# Patient Record
Sex: FEMALE | Race: BLACK OR AFRICAN AMERICAN | URBAN - METROPOLITAN AREA
[De-identification: names, ages, dates, MRNs, and addresses within clinical notes are randomized per-mention and may not be internally consistent; named-entity substitution may affect disease eponyms.]

---

## 2018-07-18 ENCOUNTER — HISTORICAL (OUTPATIENT)
Dept: ADMINISTRATIVE | Facility: HOSPITAL | Age: 23
End: 2018-07-18

## 2018-07-20 LAB — FINAL CULTURE: NORMAL

## 2022-04-07 ENCOUNTER — HISTORICAL (OUTPATIENT)
Dept: ADMINISTRATIVE | Facility: HOSPITAL | Age: 27
End: 2022-04-07

## 2022-04-23 VITALS
DIASTOLIC BLOOD PRESSURE: 69 MMHG | HEIGHT: 66 IN | OXYGEN SATURATION: 100 % | WEIGHT: 169.75 LBS | BODY MASS INDEX: 27.28 KG/M2 | SYSTOLIC BLOOD PRESSURE: 116 MMHG

## 2022-05-02 NOTE — HISTORICAL [facility] CERNER
This is a historical note converted from [facility]. Formatting and pictures may have been removed.  Please reference Malena for original formatting and attached multimedia. Chief Complaint  lower abd pain,dysuria x 2-3 weeks ago took cystex OTC helped with dysuria still has back pain  History of Present Illness  24 yo female with complaints of lower back pain x 1 1/2 months; no trauma or injury. No weakness, tingling or numbness to BLE. 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 Dysuria? 2-3 weeks ago; no hematuria or vaginal discharge. Took cystex 2-3 weeks ago with improvement of dysuria.  Review of Systems  GENERAL: Negative except as stated?in HPI  CV: Negative except as stated in HPI  RESP: Negative except as stated?in HPI  GI: Negative?except as stated in?HPI  : Negative?except as stated in?HPI  SKIN: Negative?except as stated in?HPI  Neuro: Negative?except as stated in?HPI  MS: Negative?except as stated in?HPI  Psych: Negative?except as stated in?HPI  Physical Exam  Vitals & Measurements  T:?36.7? ?C (Oral)? HR:?71(Peripheral)? RR:?18? BP:?116/69? SpO2:?100%?  HT:?167?cm? HT:?167?cm? WT:?77?kg? WT:?77?kg? BMI:?27.61?  Vital Signs reviewed  General: Alert and oriented; no acute distress.  HENT: Normocephalic.  Respiratory: Lungs clear to auscultation; respirations even and non labored.  Cardiovascular: Regular rate and rhythm. No edema. Normal peripheral pulses and perfusion.  Musculoskeletal: Normal gait. No CVA tenderness. No point tenderness over spinous processes of back. + Paravertebral lumbar?tenderness (on left)?with palpation?and?muscle spasms.?- straight leg raises.  Integumentary: Warm and dry. No bruising or erythema.  Neurologic: Alert and oriented. NO focal or sensory deficits. Symmetrical +2 strength to BLE.  Psychiatric: Cooperative.?  Assessment/Plan  1.?Acute UTI  Small leukocytes, trace blood and negative nitrite. Send urine for C&S.  Rx for macrobid 100 mg PO BID x 7 days. Increase water intake. Follow up with PCP if persists or worsens.  ?  2.?Lumbar strain  ?Diclofenac 75 mg PO BID prn pain; baclofen 10 mg at bedtime as needed for spasms. Moist heat. Follow up with PCP if condition persists. TO ER for severe pain or worsening in condition.  Ordered:  ketorolac, 60 mg, form: Injection, IM, Once-NOW, first dose 07/18/18 19:57:00 CDT, stop date 07/18/18 19:57:00 CDT  ?  Dysuria  Ordered:  Urine Culture [id number], Routine collect, 07/18/18 19:48:00 CDT, Urine, Nurse collect, Stop date 07/18/18 19:48:00 CDT, Dysuria  ?  Orders:  baclofen, 10 mg = 1 tab(s), Oral, At Bedtime, PRN PRN muscle pain, # 14 tab(s), 0 Refill(s), Pharmacy: [facility] [id number]  diclofenac, 75 mg = 1 tab(s), Oral, BID, PRN PRN as needed for pain, with food, # 30 tab(s), 0 Refill(s), Pharmacy: [facility] [id number]  nitrofurantoin, 100 mg = 1 cap(s), Oral, BID, with food, X 7 day(s), # 14 cap(s), 0 Refill(s), Pharmacy: [facility] [id number]   Problem List/Past Medical History  Ongoing  No chronic problems  Historical  No qualifying data  Medications  baclofen 10 mg oral tablet, 10 mg= 1 tab(s), Oral, At Bedtime, PRN  diclofenac sodium 75 mg oral delayed release tablet, 75 mg= 1 tab(s), Oral, BID, PRN  ethinyl estradiol-norgestimate 35 mcg-0.25 mg oral tablet, 1 tab(s), Oral, Daily  Macrobid 100 mg oral capsule, 100 mg= 1 cap(s), Oral, BID  Toradol for IM, 60 mg= 2 mL, IM, Once-NOW  Allergies  No Known Allergies  Social History  Alcohol  Current, 07/18/2018  Substance Abuse  Never, 07/18/2018  Tobacco  Never smoker Use:., 07/18/2018  Health Maintenance  Health Maintenance  ???Pending?(in the next year)  ??? ??Due?  ??? ? ? ?Alcohol Misuse Screening due??07/18/18??and every 1??year(s)  ??? ? ? ?Cervical Cancer Screening due??07/18/18??Variable frequency  ??? ? ? ?Tetanus Vaccine due??07/18/18??and every 10??year(s)  ??? ??Due In Future?  ??? ? ? ?Influenza Vaccine  not due until??10/02/18??and every 1??year(s)  ???Satisfied?(in the past 1 year)  ??? ??Satisfied?  ??? ? ? ?Blood Pressure Screening on??07/18/18.??Satisfied by Vignesh LPN, [place]  ??? ? ? ?Body Mass Index Check on??07/18/18.??Satisfied by Vignesh LPN, [place]a  ??? ? ? ?Depression Screening on??07/18/18.??Satisfied by Vignesh LPN, Tonya  ??? ? ? ?Obesity Screening on??07/18/18.??Satisfied by Vignesh LPN, Tonya  ??? ? ? ?Tobacco Use Screening on??07/18/18.??Satisfied by Vignesh LPN, Tonya  ?  ?